# Patient Record
Sex: FEMALE | Race: WHITE | Employment: FULL TIME | URBAN - METROPOLITAN AREA
[De-identification: names, ages, dates, MRNs, and addresses within clinical notes are randomized per-mention and may not be internally consistent; named-entity substitution may affect disease eponyms.]

---

## 2019-06-06 ENCOUNTER — HOSPITAL ENCOUNTER (EMERGENCY)
Age: 39
Discharge: HOME | End: 2019-06-06
Attending: EMERGENCY MEDICINE
Payer: COMMERCIAL

## 2019-06-06 VITALS
DIASTOLIC BLOOD PRESSURE: 80 MMHG | RESPIRATION RATE: 16 BRPM | HEART RATE: 96 BPM | SYSTOLIC BLOOD PRESSURE: 125 MMHG | WEIGHT: 185 LBS | HEIGHT: 65 IN | BODY MASS INDEX: 30.82 KG/M2 | OXYGEN SATURATION: 96 % | TEMPERATURE: 98.8 F

## 2019-06-06 DIAGNOSIS — R11.0 NAUSEA: Primary | ICD-10-CM

## 2019-06-06 DIAGNOSIS — H81.10 BENIGN PAROXYSMAL POSITIONAL VERTIGO, UNSPECIFIED LATERALITY: ICD-10-CM

## 2019-06-06 LAB
ALBUMIN SERPL-MCNC: 4.2 GM/DL (ref 3.4–5)
ALP BLD-CCNC: 83 IU/L (ref 40–129)
ALT SERPL-CCNC: 13 U/L (ref 10–40)
ANION GAP SERPL CALCULATED.3IONS-SCNC: 13 MMOL/L (ref 4–16)
AST SERPL-CCNC: 11 IU/L (ref 15–37)
BACTERIA: NEGATIVE /HPF
BASOPHILS ABSOLUTE: 0.1 K/CU MM
BASOPHILS RELATIVE PERCENT: 0.6 % (ref 0–1)
BILIRUB SERPL-MCNC: 0.1 MG/DL (ref 0–1)
BILIRUBIN URINE: NEGATIVE MG/DL
BLOOD, URINE: NEGATIVE
BUN BLDV-MCNC: 12 MG/DL (ref 6–23)
CALCIUM SERPL-MCNC: 9.2 MG/DL (ref 8.3–10.6)
CHLORIDE BLD-SCNC: 102 MMOL/L (ref 99–110)
CLARITY: CLEAR
CO2: 24 MMOL/L (ref 21–32)
COLOR: YELLOW
CREAT SERPL-MCNC: 0.6 MG/DL (ref 0.6–1.1)
DIFFERENTIAL TYPE: ABNORMAL
EOSINOPHILS ABSOLUTE: 0.2 K/CU MM
EOSINOPHILS RELATIVE PERCENT: 1.6 % (ref 0–3)
GFR AFRICAN AMERICAN: >60 ML/MIN/1.73M2
GFR NON-AFRICAN AMERICAN: >60 ML/MIN/1.73M2
GLUCOSE BLD-MCNC: 95 MG/DL (ref 70–99)
GLUCOSE, URINE: NEGATIVE MG/DL
HCG QUALITATIVE: NEGATIVE
HCT VFR BLD CALC: 39 % (ref 37–47)
HEMOGLOBIN: 11.4 GM/DL (ref 12.5–16)
IMMATURE NEUTROPHIL %: 0.3 % (ref 0–0.43)
KETONES, URINE: NEGATIVE MG/DL
LEUKOCYTE ESTERASE, URINE: NEGATIVE
LYMPHOCYTES ABSOLUTE: 2.8 K/CU MM
LYMPHOCYTES RELATIVE PERCENT: 28.1 % (ref 24–44)
MCH RBC QN AUTO: 22.4 PG (ref 27–31)
MCHC RBC AUTO-ENTMCNC: 29.2 % (ref 32–36)
MCV RBC AUTO: 76.5 FL (ref 78–100)
MONOCYTES ABSOLUTE: 0.6 K/CU MM
MONOCYTES RELATIVE PERCENT: 6.2 % (ref 0–4)
NITRITE URINE, QUANTITATIVE: NEGATIVE
NUCLEATED RBC %: 0 %
PDW BLD-RTO: 17.1 % (ref 11.7–14.9)
PH, URINE: 6 (ref 5–8)
PLATELET # BLD: 268 K/CU MM (ref 140–440)
PMV BLD AUTO: 10.6 FL (ref 7.5–11.1)
POTASSIUM SERPL-SCNC: 4 MMOL/L (ref 3.5–5.1)
PROTEIN UA: NEGATIVE MG/DL
RBC # BLD: 5.1 M/CU MM (ref 4.2–5.4)
RBC URINE: <1 /HPF (ref 0–6)
SEGMENTED NEUTROPHILS ABSOLUTE COUNT: 6.2 K/CU MM
SEGMENTED NEUTROPHILS RELATIVE PERCENT: 63.2 % (ref 36–66)
SODIUM BLD-SCNC: 139 MMOL/L (ref 135–145)
SPECIFIC GRAVITY UA: 1.01 (ref 1–1.03)
SQUAMOUS EPITHELIAL: 12 /HPF
TOTAL IMMATURE NEUTOROPHIL: 0.03 K/CU MM
TOTAL NUCLEATED RBC: 0 K/CU MM
TOTAL PROTEIN: 7 GM/DL (ref 6.4–8.2)
TRICHOMONAS: NORMAL /HPF
UROBILINOGEN, URINE: NORMAL MG/DL (ref 0.2–1)
WBC # BLD: 9.8 K/CU MM (ref 4–10.5)
WBC UA: 1 /HPF (ref 0–5)

## 2019-06-06 PROCEDURE — 6370000000 HC RX 637 (ALT 250 FOR IP): Performed by: EMERGENCY MEDICINE

## 2019-06-06 PROCEDURE — 80053 COMPREHEN METABOLIC PANEL: CPT

## 2019-06-06 PROCEDURE — 96374 THER/PROPH/DIAG INJ IV PUSH: CPT

## 2019-06-06 PROCEDURE — 81001 URINALYSIS AUTO W/SCOPE: CPT

## 2019-06-06 PROCEDURE — 85025 COMPLETE CBC W/AUTO DIFF WBC: CPT

## 2019-06-06 PROCEDURE — 2580000003 HC RX 258: Performed by: EMERGENCY MEDICINE

## 2019-06-06 PROCEDURE — 84703 CHORIONIC GONADOTROPIN ASSAY: CPT

## 2019-06-06 PROCEDURE — 36415 COLL VENOUS BLD VENIPUNCTURE: CPT

## 2019-06-06 PROCEDURE — 96361 HYDRATE IV INFUSION ADD-ON: CPT

## 2019-06-06 PROCEDURE — 99284 EMERGENCY DEPT VISIT MOD MDM: CPT

## 2019-06-06 PROCEDURE — 6360000002 HC RX W HCPCS: Performed by: EMERGENCY MEDICINE

## 2019-06-06 PROCEDURE — 93005 ELECTROCARDIOGRAM TRACING: CPT | Performed by: EMERGENCY MEDICINE

## 2019-06-06 RX ORDER — MECLIZINE HYDROCHLORIDE 25 MG/1
25 TABLET ORAL 3 TIMES DAILY PRN
Qty: 30 TABLET | Refills: 0 | Status: SHIPPED | OUTPATIENT
Start: 2019-06-06 | End: 2019-06-16

## 2019-06-06 RX ORDER — MECLIZINE HYDROCHLORIDE 25 MG/1
25 TABLET ORAL ONCE
Status: COMPLETED | OUTPATIENT
Start: 2019-06-06 | End: 2019-06-06

## 2019-06-06 RX ORDER — ONDANSETRON 2 MG/ML
4 INJECTION INTRAMUSCULAR; INTRAVENOUS EVERY 6 HOURS PRN
Status: DISCONTINUED | OUTPATIENT
Start: 2019-06-06 | End: 2019-06-07 | Stop reason: HOSPADM

## 2019-06-06 RX ORDER — 0.9 % SODIUM CHLORIDE 0.9 %
1000 INTRAVENOUS SOLUTION INTRAVENOUS ONCE
Status: COMPLETED | OUTPATIENT
Start: 2019-06-06 | End: 2019-06-06

## 2019-06-06 RX ORDER — ONDANSETRON 4 MG/1
4 TABLET, FILM COATED ORAL DAILY PRN
Qty: 30 TABLET | Refills: 0 | Status: SHIPPED | OUTPATIENT
Start: 2019-06-06

## 2019-06-06 RX ADMIN — MECLIZINE HYDROCHLORIDE 25 MG: 25 TABLET ORAL at 20:17

## 2019-06-06 RX ADMIN — SODIUM CHLORIDE 1000 ML: 9 INJECTION, SOLUTION INTRAVENOUS at 20:18

## 2019-06-06 RX ADMIN — ONDANSETRON 4 MG: 2 INJECTION INTRAMUSCULAR; INTRAVENOUS at 20:17

## 2019-06-06 NOTE — ED PROVIDER NOTES
Triage Chief Complaint:   Dizziness (onset today ); Nausea (onset 1 week no vomiting); and Shaking    [place]:  Trip Lares is a 43 y.o. female that presents with nausea and dizziness. Patient was in baseline state of health until 1 week ago when the above started. Patient has been nauseous with eating for the past week. No vomiting. No pain. No change in diet. No new meds. Not taking anything for this. Some dizziness today with moving \"head too fast\". Dizziness is described as spinning sensation. No numbness/weakness. No vision changes. Patient does have history of chronic ear infections and she is wondering if that is causing her problems. Patient with constant \"ear problems\" and nothing out of the ordinary at this time. No head trauma. No fevers. ROS:  General:  No fevers, no chills, no weakness  Eyes:  No recent vison changes, no discharge  ENT:  No sore throat, no nasal congestion, no hearing changes  Cardiovascular:  No chest pain, no palpitations  Respiratory:  No shortness of breath, no cough, no wheezing  Gastrointestinal:  No pain, + nausea, no vomiting, no diarrhea  Musculoskeletal:  No muscle pain, no joint pain  Skin:  No rash, no pruritis, no easy bruising  Neurologic:  No speech problems, no headache, no extremity numbness, no extremity tingling, no extremity weakness, + dizziness  Psychiatric:  No anxiety  Genitourinary:  No dysuria, no hematuria  Endocrine:  No unexpected weight gain, no unexpected weight loss  Extremities:  no edema, no pain    History reviewed. No pertinent past medical history. Past Surgical History:   Procedure Laterality Date     SECTION      TUBAL LIGATION       History reviewed. No pertinent family history.   Social History     Socioeconomic History    Marital status:      Spouse name: Not on file    Number of children: Not on file    Years of education: Not on file    Highest education level: Not on file   Occupational History    Not on file   Social Needs    Financial resource strain: Not on file    Food insecurity:     Worry: Not on file     Inability: Not on file    Transportation needs:     Medical: Not on file     Non-medical: Not on file   Tobacco Use    Smoking status: Current Every Day Smoker     Packs/day: 1.00     Types: Cigarettes    Smokeless tobacco: Never Used   Substance and Sexual Activity    Alcohol use: No    Drug use: No    Sexual activity: Not on file   Lifestyle    Physical activity:     Days per week: Not on file     Minutes per session: Not on file    Stress: Not on file   Relationships    Social connections:     Talks on phone: Not on file     Gets together: Not on file     Attends [employer] service: Not on file     Active member of club or organization: Not on file     Attends meetings of clubs or organizations: Not on file     Relationship status: Not on file    Intimate partner violence:     Fear of current or ex partner: Not on file     Emotionally abused: Not on file     Physically abused: Not on file     Forced sexual activity: Not on file   Other Topics Concern    Not on file   Social History Narrative    Not on file     Current Facility-Administered Medications   Medication Dose Route Frequency Provider Last Rate Last Dose    ondansetron (ZOFRAN) injection 4 mg  4 mg Intravenous Q6H PRN Naty Alcazar MD   4 mg at 06/06/19 2017     Current Outpatient Medications   Medication Sig Dispense Refill    ondansetron (ZOFRAN) 4 MG tablet Take 1 tablet by mouth daily as needed for Nausea or Vomiting 30 tablet 0    meclizine (ANTIVERT) 25 MG tablet Take 1 tablet by mouth 3 times daily as needed for Dizziness or Nausea 30 tablet 0     Allergies   Allergen Reactions    Penicillins Anaphylaxis       Nursing Notes Reviewed    Physical Exam:  ED Triage Vitals [06/06/19 1727]   Enc Vitals Group      /80      Pulse 96      Resp 16      Temp 98.8 °F (37.1 °C)      Temp Source Oral      SpO2 96 % Weight 185 lb (83.9 kg)      Height 5' 5\" (1.651 m)      Head Circumference       Peak Flow       Pain Score       Pain Loc       Pain Edu? Excl. in [address]? My pulse ox interpretation is - normal    General appearance:  No acute distress. Sitting comfortable in bed. Appears very well. Pleasant. Skin:  Warm. Dry. No diaphoresis. Eye:  Extraocular movements intact. Pupils equal round and reactive to light. Ears, nose, mouth and throat:  Oral mucosa moist   Neck:  Trachea midline. No meningismus. Extremity:  No swelling. Normal ROM     Heart:  Regular rate and rhythm, normal S1 & S2, no extra heart sounds. Perfusion:  Intact   Respiratory:  Lungs clear to auscultation bilaterally. Respirations nonlabored. Speaking clearly in full sentences. No respiratory distress. Abdominal:  Normal bowel sounds. Soft. Nontender. Non distended. Completely nontender. Negative Herrera's. Back:  No CVA tenderness to palpation     Neurological:  Alert and oriented times 3. No focal neuro deficits.   Sensation intact to light touch to distal upper/lower extremities; 5/5 and symmetric shrug, , HF, KF/KE, and dorsi/plantar flexion; symmetric brow raise and nasolabial folds, tongue midline; FTN and NELLY intact; 2+ and symmetric reflexes to bilateral upper/lower extremities; ambulates with steady gait; no dysarthria or aphasia          Psychiatric:  Appropriate    I have reviewed and interpreted all of the currently available lab results from this visit (if applicable):  Results for orders placed or performed during the hospital encounter of 06/06/19   CBC Auto Differential   Result Value Ref Range    WBC 9.8 4.0 - 10.5 K/CU MM    RBC 5.10 4.2 - 5.4 M/CU MM    Hemoglobin 11.4 (L) 12.5 - 16.0 GM/DL    Hematocrit 39.0 37 - 47 %    MCV 76.5 (L) 78 - 100 FL    MCH 22.4 (L) 27 - 31 PG    MCHC 29.2 (L) 32.0 - 36.0 %    RDW 17.1 (H) 11.7 - 14.9 %    Platelets 217 493 - 551 K/CU MM    MPV 10.6 7.5 - 11.1 FL Differential Type AUTOMATED DIFFERENTIAL     Segs Relative 63.2 36 - 66 %    Lymphocytes % 28.1 24 - 44 %    Monocytes % 6.2 (H) 0 - 4 %    Eosinophils % 1.6 0 - 3 %    Basophils % 0.6 0 - 1 %    Segs Absolute 6.2 K/CU MM    Lymphocytes # 2.8 K/CU MM    Monocytes # 0.6 K/CU MM    Eosinophils # 0.2 K/CU MM    Basophils # 0.1 K/CU MM    Nucleated RBC % 0.0 %    Total Nucleated RBC 0.0 K/CU MM    Total Immature Neutrophil 0.03 K/CU MM    Immature Neutrophil % 0.3 0 - 0.43 %   Comprehensive Metabolic Panel   Result Value Ref Range    Sodium 139 135 - 145 MMOL/L    Potassium 4.0 3.5 - 5.1 MMOL/L    Chloride 102 99 - 110 mMol/L    CO2 24 21 - 32 MMOL/L    BUN 12 6 - 23 MG/DL    CREATININE 0.6 0.6 - 1.1 MG/DL    Glucose 95 70 - 99 MG/DL    Calcium 9.2 8.3 - 10.6 MG/DL    Alb 4.2 3.4 - 5.0 GM/DL    Total Protein 7.0 6.4 - 8.2 GM/DL    Total Bilirubin 0.1 0.0 - 1.0 MG/DL    ALT 13 10 - 40 U/L    AST 11 (L) 15 - 37 IU/L    Alkaline Phosphatase 83 40 - 129 IU/L    GFR Non-African American >60 >60 mL/min/1.73m2    GFR African American >60 >60 mL/min/1.73m2    Anion Gap 13 4 - 16   Urinalysis   Result Value Ref Range    Color, UA YELLOW YELLOW    Clarity, UA CLEAR CLEAR    Glucose, Urine NEGATIVE NEGATIVE MG/DL    Bilirubin Urine NEGATIVE NEGATIVE MG/DL    Ketones, Urine NEGATIVE NEGATIVE MG/DL    Specific Gravity, UA 1.009 1.001 - 1.035    Blood, Urine NEGATIVE NEGATIVE    pH, Urine 6.0 5.0 - 8.0    Protein, UA NEGATIVE NEGATIVE MG/DL    Urobilinogen, Urine NORMAL 0.2 - 1.0 MG/DL    Nitrite Urine, Quantitative NEGATIVE NEGATIVE    Leukocyte Esterase, Urine NEGATIVE NEGATIVE    RBC, UA <1 0 - 6 /HPF    WBC, UA 1 0 - 5 /HPF    Bacteria, UA NEGATIVE NEGATIVE /HPF    Squam Epithel, UA 12 /HPF    Trichomonas, UA NONE SEEN NONE SEEN /HPF   HCG Serum, Qualitative   Result Value Ref Range    hCG Qual NEGATIVE    EKG 12 Lead   Result Value Ref Range    Ventricular Rate 71 BPM    Atrial Rate 71 BPM    P-R Interval 134 ms    QRS Duration 96 ms    Q-T Interval 388 ms    QTc Calculation (Bazett) 421 ms    P Axis 25 degrees    R Axis 33 degrees    T Axis 48 degrees    Diagnosis       Normal sinus rhythm with sinus arrhythmia  Normal ECG  No previous ECGs available        Radiographs (if obtained):  [] The following radiograph was interpreted by myself in the absence of a radiologist:   [] Radiologist's Report Reviewed:  No orders to display         EKG (if obtained): (All EKG's are interpreted by myself in the absence of a cardiologist)  12 lead EKG per my interpretation:  Normal Sinus Rhythm at 71 with sinus arrhythmia  Axis is   Normal  QTc is  within an acceptable range  There is no specific T wave changes appreciated. There is no specific ST wave changes appreciated. No STEMI    Prior EKG to compare with was not available. Chart review shows recent radiographs:  No results found. MDM:  Pt presents as above. Emergent conditions considered. Presentation prompted initial labs, EKG. IV was established and IV fluid bolus, meclizine and IV Zofran were given. CBC and CMP without clinically significant derangement. HCG negative. Urinalysis negative for urinary tract infection. EKG with a normal sinus rhythm as above. Patient with nausea only and likely a peripheral vertiginous process. No red flag features probably more emergent workup. Patient reports feeling much improved on reassessment and is willing to go home. Patient counseled the need for close primary care follow-up. Sinemet treatment with Zofran and meclizine for home. Questions sought and answered with the patient. They voice understanding and agree with plan. Instructed to return for any worsening or worrisome concerns. Clinical Impression:  1. Nausea    2. Benign paroxysmal positional vertigo, unspecified laterality      Disposition referral (if applicable):   [facility]  [phone]  Schedule an appointment as soon as possible for a visit   [address] (SEE BELOW)    [facility] Emergency Department  [address] [id number]  [phone]  Today  If symptoms worsen    Disposition medications (if applicable):  New Prescriptions    MECLIZINE (ANTIVERT) 25 MG TABLET    Take 1 tablet by mouth 3 times daily as needed for Dizziness or Nausea    ONDANSETRON (ZOFRAN) 4 MG TABLET    Take 1 tablet by mouth daily as needed for Nausea or Vomiting       Comment: Please note this report has been produced using speech recognition software and may contain errors related to that system including errors in grammar, punctuation, and spelling, as well as words and phrases that may be inappropriate. If there are any questions or concerns please feel free to contact the dictating provider for clarification.        Bryan Curtis MD  06/06/19 0280

## 2019-06-07 LAB
EKG ATRIAL RATE: 71 BPM
EKG DIAGNOSIS: NORMAL
EKG P AXIS: 25 DEGREES
EKG P-R INTERVAL: 134 MS
EKG Q-T INTERVAL: 388 MS
EKG QRS DURATION: 96 MS
EKG QTC CALCULATION (BAZETT): 421 MS
EKG R AXIS: 33 DEGREES
EKG T AXIS: 48 DEGREES
EKG VENTRICULAR RATE: 71 BPM

## 2019-06-07 PROCEDURE — 93010 ELECTROCARDIOGRAM REPORT: CPT | Performed by: INTERNAL MEDICINE

## 2019-06-07 NOTE — PROGRESS NOTES
Medication History  [facility]    Patient Name: Mak Crews 1980     Medication history has been completed by: Marlene Sen CPhT    Source(s) of information: Patient and Insurance claims    Primary Care Physician: No primary care provider on file. Pharmacy: Unknown    Allergies as of 06/06/2019 - Review Complete 06/06/2019   Allergen Reaction Noted    Penicillins Anaphylaxis 08/03/2017        Prior to Admission medications    Medication Sig Start Date End Date Taking?  Authorizing Provider                     Medications removed from list (include reason, ex. noncompliance, medication cost, therapy complete etc.):   Montelukast no longer taking  Naproxen no longer taking    Other Comments:  Reviewed and updated med list per patient and verified with claims    To my knowledge the above medication history is accurate as of 6/6/2019 7:58 PM.   Marlene Sen CPhT   6/6/2019 7:58 PM

## 2022-05-16 ENCOUNTER — APPOINTMENT (OUTPATIENT)
Dept: GENERAL RADIOLOGY | Age: 42
End: 2022-05-16
Payer: COMMERCIAL

## 2022-05-16 ENCOUNTER — HOSPITAL ENCOUNTER (EMERGENCY)
Age: 42
Discharge: HOME | End: 2022-05-16
Attending: EMERGENCY MEDICINE
Payer: COMMERCIAL

## 2022-05-16 VITALS
SYSTOLIC BLOOD PRESSURE: 142 MMHG | DIASTOLIC BLOOD PRESSURE: 80 MMHG | RESPIRATION RATE: 16 BRPM | HEIGHT: 65 IN | OXYGEN SATURATION: 98 % | BODY MASS INDEX: 31.65 KG/M2 | WEIGHT: 190 LBS | TEMPERATURE: 97.3 F | HEART RATE: 83 BPM

## 2022-05-16 DIAGNOSIS — M25.462 KNEE EFFUSION, LEFT: ICD-10-CM

## 2022-05-16 DIAGNOSIS — S83.8X2A SPRAIN OF OTHER LIGAMENT OF LEFT KNEE, INITIAL ENCOUNTER: Primary | ICD-10-CM

## 2022-05-16 PROCEDURE — 99283 EMERGENCY DEPT VISIT LOW MDM: CPT

## 2022-05-16 PROCEDURE — 73562 X-RAY EXAM OF KNEE 3: CPT

## 2022-05-16 RX ORDER — NAPROXEN 375 MG/1
375 TABLET ORAL 2 TIMES DAILY PRN
Qty: 60 TABLET | Refills: 0 | Status: SHIPPED | OUTPATIENT
Start: 2022-05-16

## 2022-05-16 RX ORDER — METHOCARBAMOL 500 MG/1
500 TABLET, FILM COATED ORAL 3 TIMES DAILY PRN
Qty: 30 TABLET | Refills: 0 | Status: SHIPPED | OUTPATIENT
Start: 2022-05-16 | End: 2022-05-26

## 2022-05-16 ASSESSMENT — PAIN DESCRIPTION - DESCRIPTORS: DESCRIPTORS: ACHING

## 2022-05-16 ASSESSMENT — PAIN SCALES - GENERAL: PAINLEVEL_OUTOF10: 8

## 2022-05-16 ASSESSMENT — PAIN DESCRIPTION - ORIENTATION: ORIENTATION: LEFT

## 2022-05-16 ASSESSMENT — PAIN - FUNCTIONAL ASSESSMENT: PAIN_FUNCTIONAL_ASSESSMENT: 0-10

## 2022-05-16 ASSESSMENT — PAIN DESCRIPTION - LOCATION: LOCATION: KNEE

## 2022-05-16 NOTE — ED NOTES
The patient presents to the er today with complaints of left knee pain. The patient denies any injury to the left knee, and reports that is just started hurting last night. Call light is within reach, family is at the bedside. The patient was able to walk to room 3.                         Teagan Roger RN  05/16/22 3833

## 2022-05-16 NOTE — ED PROVIDER NOTES
Emergency Department Encounter  [address]    Patient: Sherri Portillo  MRN: [id number]  : 1980  Date of Evaluation: 2022  ED Provider: Chacorta Hall MD    Chief Complaint       Chief Complaint   Patient presents with    Knee Pain     reports of left knee pain that started last night denies any njury      Tristen Stout is a 42 y.o. female who presents to the emergency department for evaluation of atraumatic left knee pain. Patient reports being in usual state of health until yesterday. Does report that she frequently will get knee discomfort she says but she is usually able to\" pop\" her knee and have resolution of the pain in an hour or 2. Yesterday after attending a family birthday party patient says that she had left knee pain but was unable to get relief from it. Says it has been bothering her all night long. Denies numbness tingling coldness distal to the point of injury. Pain is worse with ambulation or bending her knee. States that she has not been able to get any relief. Has not tried taking medications for her symptoms as of yet. ROS:     At least 10 systems reviewed and otherwise acutely negative except as in the [address]     Past History     Past Medical History:   Diagnosis Date    Kidney stone      Past Surgical History:   Procedure Laterality Date     SECTION      TUBAL LIGATION       Social History     Socioeconomic History    Marital status:      Spouse name: None    Number of children: None    Years of education: None    Highest education level: None   Occupational History    None   Tobacco Use    Smoking status: Current Every Day Smoker     Packs/day: 1.00     Types: Cigarettes    Smokeless tobacco: Never Used   Substance and Sexual Activity    Alcohol use: No     Comment: rare    Drug use: No    Sexual activity: Yes     Partners: Male   Other Topics Concern    None   Social History Narrative    None     Social Determinants of Health     Financial Resource Strain:     Difficulty of Paying Living Expenses: Not on file   Food Insecurity:     Worried About Running Out of Food in the Last Year: Not on file    Cleo of Food in the Last Year: Not on file   Transportation Needs:     Lack of Transportation (Medical): Not on file    Lack of Transportation (Non-Medical): Not on file   Physical Activity:     Days of Exercise per Week: Not on file    Minutes of Exercise per Session: Not on file   Stress:     Feeling of Stress : Not on file   Social Connections:     Frequency of Communication with Friends and Family: Not on file    Frequency of Social Gatherings with Friends and Family: Not on file    Attends Christianity Services: Not on file    Active Member of [address] [place] [address] [employer] or Organizations: Not on file    Attends Club or Organization Meetings: Not on file    Marital Status: Not on file   Intimate Partner Violence:     Fear of Current or Ex-Partner: Not on file    Emotionally Abused: Not on file    Physically Abused: Not on file    Sexually Abused: Not on file   Housing Stability:     Unable to Pay for Housing in the Last Year: Not on file    Number of [place] in the Last Year: Not on file    Unstable Housing in the Last Year: Not on file       Medications/Allergies     Previous Medications    ONDANSETRON (ZOFRAN) 4 MG TABLET    Take 1 tablet by mouth daily as needed for Nausea or Vomiting     Allergies   Allergen Reactions    Penicillins Anaphylaxis        Physical Exam       ED Triage Vitals [05/16/22 0801]   BP Temp Temp Source Pulse Resp SpO2 Height Weight   (!) 142/80 97.3 °F (36.3 °C) Skin 83 16 98 % 5' 5\" (1.651 m) 190 lb (86.2 kg)     GENERAL APPEARANCE: Awake and alert. Cooperative. No acute distress. HEAD: Normocephalic. Atraumatic. EYES: Sclera anicteric. Pupils equal round reactive to light extraocular movements are intact  ENT: Tolerates saliva. No trismus. Moist mucous membranes  NECK: Supple. Trachea midline. No meningismus  CARDIO: RRR. Radial pulse 2+. No murmurs rubs or gallops appreciated  LUNGS: Respirations unlabored. CTAB. No accessory muscle usage noted. No wheezes rales rhonchi or stridor. ABDOMEN: Soft. Non-distended. Non-tender. No tenderness in right upper quadrant or right lower quadrant to deep palpation  EXTREMITIES: No acute deformities. No unilateral leg swelling or tenderness behind either one of calves particular examination of patient's left lower leg no tenderness when palpating throughout the left hip proximal distal femur. No tenderness over the fibular head or tibial plateau. Negative anterior and posterior drawer sign. No joint laxity during varus and valgus stressing. Palpable DP and PT pulses intact sensation light touch capillary refills less than 3 seconds no tenderness behind the patient's left calf. No tense when palpating over the first or fifth metatarsal or throughout the left ankle. SKIN: Warm and dry. No erythema edema or rashes appreciated  NEUROLOGICAL:  Cranial nerves II through XII grossly intact. No gross facial drooping. Moves all 4 extremities spontaneously. PSYCHIATRIC: Normal mood. Alert and oriented x3. No reported active suicidality or homicidality. Diagnostics   Labs:  No results found for this visit on 05/16/22. Radiographs:  XR KNEE LEFT (3 VIEWS)    Result Date: 5/16/2022  EXAMINATION: THREE XRAY VIEWS OF THE LEFT KNEE 5/16/2022 8:17 am COMPARISON: None. HISTORY: ORDERING SYSTEM PROVIDED HISTORY: pain no trauma TECHNOLOGIST PROVIDED HISTORY: Reason for exam:->pain no trauma FINDINGS: There is no acute fracture or dislocation. Moderate joint effusion is noted. Well-defined sclerotic lesion is present within the proximal medial tibial metaphysis favoring a bone island. The joint spaces are maintained. 1. Moderate joint effusion.        Procedures/EKG:       ED Course and MDM   In brief, Kassi Mcintyre is a 42 y.o. female who presented to the emergency department for evaluation of atraumatic left knee pain. Based on patient's history physical would be concerned about possible osteoarthritis no evidence of trauma noted examination she is not complaining of any paresthesias distal to the point of injury no evidence of neurovascular disruption or trauma at this time. No tenderness by the patient's left calf to suggest DVT given that this is happened multiple times in the past low clinical suspicion for vascular type injury. Did review patient's imaging studies. Does show moderate joint effusion noted on x-ray. Discussed concern for possible meniscal injury or ligamentous type injury. Recommend use of crutches and knee immobilizer and close follow-up with orthopedics for further evaluation. Recommend close follow-up with primary care physician or referral physician next 24 to 48 hours. Return to the emergency department for increasing pain, numbness tingling coldness distal to the point of injury, any other concerning symptoms. She did express a verbal understanding of these instructions        ED Medication Orders (From admission, onward)    None          Final Impression      1. Sprain of other ligament of left knee, initial encounter    2.  Knee effusion, left      DISPOSITION           (Please note that portions of this note may have been completed with a voice recognition program. Efforts were made to edit the dictations but occasionally words are mis-transcribed.)    Ibrahima Mehta MD  [id number] MD  05/16/22 [id number]

## 2022-05-16 NOTE — LETTER
Jameel Sifuentes was seen and treated in our emergency department on 5/16/2022. She may return to work on 05/18/2022. If you have any questions or concerns, please don't hesitate to call.       Yarelis Beasley MD

## 2022-05-16 NOTE — ED NOTES
Discharge instructions and prescriptions were reviewed and the patient will follow up with Dr. Coco Sawyer. The patient voiced understanding of these instructions.        Anshu Viera RN  05/16/22 [id number]

## 2022-05-16 NOTE — ED NOTES
A knee immobilizer was placed on the left leg. The patient was fitted with crutches and she was able to demonstrate proper use.       Arturo Wheeler RN  05/16/22 [id number]

## 2022-05-19 ENCOUNTER — OFFICE VISIT (OUTPATIENT)
Dept: ORTHOPEDIC SURGERY | Age: 42
End: 2022-05-19
Payer: COMMERCIAL

## 2022-05-19 VITALS
WEIGHT: 190 LBS | BODY MASS INDEX: 31.65 KG/M2 | SYSTOLIC BLOOD PRESSURE: 148 MMHG | HEIGHT: 65 IN | OXYGEN SATURATION: 98 % | HEART RATE: 67 BPM | DIASTOLIC BLOOD PRESSURE: 98 MMHG | RESPIRATION RATE: 15 BRPM

## 2022-05-19 DIAGNOSIS — S83.92XA SPRAIN OF LEFT KNEE, UNSPECIFIED LIGAMENT, INITIAL ENCOUNTER: ICD-10-CM

## 2022-05-19 DIAGNOSIS — M25.462 EFFUSION, LEFT KNEE: Primary | ICD-10-CM

## 2022-05-19 PROCEDURE — 99203 OFFICE O/P NEW LOW 30 MIN: CPT | Performed by: ORTHOPAEDIC SURGERY

## 2022-05-19 NOTE — PROGRESS NOTES
Patient presents to the office today for ED FU of the left knee pain. Patient states pain started on 5/15/22 and has progressively gotten worse. Pain is globally in the left knee. She denies any injury to the left knee. She has been wearing her brace which does make the pain worse. Pt states this has happened to her several years ago also. Pt is not able to bear full weight on the left knee.

## 2022-05-19 NOTE — PROGRESS NOTES
2022   Chief Complaint   Patient presents with    Knee Pain     left knee pain         History of Present Illness:                             Colby Cm is a 42 y.o. female who presents today for evaluation of her left knee. She was seen in the emergency room because of severe pain and swelling that happened spontaneously. She has had issues in the past where she has to pop her knee and move it in a certain way to get it to release. She has had significant progression of her swelling over the last week or so. She has tried bracing and anti-inflammatory medications with no relief of her symptoms. Patient presents to the office today for ED FU of the left knee pain. Patient states pain started on 5/15/22 and has progressively gotten worse. Pain is globally in the left knee. She denies any injury to the left knee. She has been wearing her brace which does make the pain worse. Pt states this has happened to her several years ago also. Pt is not able to bear full weight on the left knee. Medical History  Patient's medications, allergies, past medical, surgical, social and family histories were reviewed and updated as appropriate. Past Medical History:   Diagnosis Date    Kidney stone      Past Surgical History:   Procedure Laterality Date     SECTION      TUBAL LIGATION       No family history on file.   Social History     Socioeconomic History    Marital status:      Spouse name: None    Number of children: None    Years of education: None    Highest education level: None   Occupational History    None   Tobacco Use    Smoking status: Current Every Day Smoker     Packs/day: 1.00     Types: Cigarettes    Smokeless tobacco: Never Used   Substance and Sexual Activity    Alcohol use: No     Comment: rare    Drug use: No    Sexual activity: Yes     Partners: Male   Other Topics Concern    None   Social History Narrative    None     Social Determinants of Health     Financial Resource Strain:     Difficulty of Paying Living Expenses: Not on file   Food Insecurity:     Worried About Running Out of Food in the Last Year: Not on file    Cleo of Food in the Last Year: Not on file   Transportation Needs:     Lack of Transportation (Medical): Not on file    Lack of Transportation (Non-Medical): Not on file   Physical Activity:     Days of Exercise per Week: Not on file    Minutes of Exercise per Session: Not on file   Stress:     Feeling of Stress : Not on file   Social Connections:     Frequency of Communication with Friends and Family: Not on file    Frequency of Social Gatherings with Friends and Family: Not on file    Attends Latter day Services: Not on file    Active Member of [address] [place] [address] [employer] or Organizations: Not on file    Attends Club or Organization Meetings: Not on file    Marital Status: Not on file   Intimate Partner Violence:     Fear of Current or Ex-Partner: Not on file    Emotionally Abused: Not on file    Physically Abused: Not on file    Sexually Abused: Not on file   Housing Stability:     Unable to Pay for Housing in the Last Year: Not on file    Number of [place] in the Last Year: Not on file    Unstable Housing in the Last Year: Not on file     Current Outpatient Medications   Medication Sig Dispense Refill    naproxen (NAPROSYN) 375 MG tablet Take 1 tablet by mouth 2 times daily as needed for Pain 60 tablet 0    methocarbamol (ROBAXIN) 500 MG tablet Take 1 tablet by mouth 3 times daily as needed (muscle pain) 30 tablet 0    ondansetron (ZOFRAN) 4 MG tablet Take 1 tablet by mouth daily as needed for Nausea or Vomiting (Patient not taking: Reported on 5/16/2022) 30 tablet 0     No current facility-administered medications for this visit. Allergies   Allergen Reactions    Penicillins Anaphylaxis         Review of Systems   Constitutional: Negative for chills and fever. HENT: Negative for congestion and sneezing.     Eyes: Negative for pain and redness. Respiratory: Negative for chest tightness, shortness of breath and wheezing. Cardiovascular: Negative for chest pain and palpitations. Gastrointestinal: Negative for vomiting. Musculoskeletal: Positive for arthralgias. Skin: Negative for color change and rash. Neurological: Negative for weakness and numbness. Psychiatric/Behavioral: Negative for agitation. The patient is not nervous/anxious. Examination:  General Exam:  Vitals: BP (!) 148/98   Pulse 67   Resp 15   Ht 5' 5\" (1.651 m)   Wt 190 lb (86.2 kg)   SpO2 98%   BMI 31.62 kg/m²    Physical Exam  Vitals and nursing note reviewed. Constitutional:       Appearance: Normal appearance. HENT:      Head: Normocephalic and atraumatic. Eyes:      Conjunctiva/sclera: Conjunctivae normal.      Pupils: Pupils are equal, round, and reactive to light. Pulmonary:      Effort: Pulmonary effort is normal.   Musculoskeletal:      Cervical back: Normal range of motion. Right hip: Normal.      Left hip: No deformity, tenderness, bony tenderness or crepitus. Normal range of motion. Normal strength. Right knee: No swelling, deformity, effusion, erythema, ecchymosis or bony tenderness. Normal range of motion. No medial joint line or lateral joint line tenderness. No LCL laxity or MCL laxity. Normal alignment and normal patellar mobility. Comments: Left Lower Extremity:    There is moderate diffuse tenderness at the knee joint and severe tenderness maximally along the the medial joint line. There is a moderate effusion present at the knee. Range of motion is from full extension to 130 degrees of flexion and limited at terminal flexion due to pain. There is a positive medial Hina's test with pain and mild clicking.   There is a negative anterior drawer and Lachman test.  Negative posterior drawer test.  No laxity during valgus stress testing at full extension and 30 degrees of flexion. No laxity with varus stress test.  Skin is intact with no lacerations. No erythema or rash. Strength at the knee is 5 out of 5 with flexion and extension however testing is limited due to pain. Sensation to light touch is intact throughout. Pulses intact     Skin:     General: Skin is warm and dry. Neurological:      Mental Status: She is alert and oriented to person, place, and time. Psychiatric:         Mood and Affect: Mood normal.         Behavior: Behavior normal.            Diagnostic testing:  X-ray images were reviewed by myself and discussed with the patient:  FINDINGS:   There is no acute fracture or dislocation.  Moderate joint effusion is noted. Well-defined sclerotic lesion is present within the proximal medial tibial   metaphysis favoring a bone island.  The joint spaces are maintained.           Impression   1. Moderate joint effusion. Office Procedures:  No orders of the defined types were placed in this encounter. Assessment and Plan  1. left knee sprain    2. Left knee effusion    Based on the history and my physical exam, I have a high index of suspicion for intra-articular pathology such as a tear of the meniscus. I feel it is medically necessary to obtain an MRI to evaluate for tear of the medial meniscus and other intra-articular pathology such injury to the collateral ligaments, cartilage surfaces, or presence of loose body. I have instructed the patient on gentle range of motion exercises for the knee and avoidance of strenuous or painful activities. They will follow-up after the MRI for a review of the results.       Electronically signed by Samra Kapadia MD on 5/19/2022 at 11:51 AM

## 2022-05-19 NOTE — PATIENT INSTRUCTIONS
Continue weight-bearing as tolerated. Continue range of motion exercises as instructed. Ice and elevate as needed. Tylenol or Motrin for pain. Mri of the left knee ordered today  Central scheduling [phone] will be calling you to schedule your MRI. If you do not hear from them with in a week give them a call.    Follow up once MRI is completed

## 2022-05-24 ASSESSMENT — ENCOUNTER SYMPTOMS
WHEEZING: 0
EYE REDNESS: 0
VOMITING: 0
COLOR CHANGE: 0
SHORTNESS OF BREATH: 0
CHEST TIGHTNESS: 0
EYE PAIN: 0

## 2022-05-27 ENCOUNTER — HOSPITAL ENCOUNTER (OUTPATIENT)
Dept: MRI IMAGING | Age: 42
Discharge: HOME | End: 2022-05-27
Payer: COMMERCIAL

## 2022-05-27 DIAGNOSIS — S83.92XA SPRAIN OF LEFT KNEE, UNSPECIFIED LIGAMENT, INITIAL ENCOUNTER: ICD-10-CM

## 2022-05-27 DIAGNOSIS — M25.462 EFFUSION, LEFT KNEE: ICD-10-CM

## 2022-05-27 PROCEDURE — 73721 MRI JNT OF LWR EXTRE W/O DYE: CPT

## 2022-06-02 ENCOUNTER — OFFICE VISIT (OUTPATIENT)
Dept: ORTHOPEDIC SURGERY | Age: 42
End: 2022-06-02
Payer: COMMERCIAL

## 2022-06-02 VITALS
HEIGHT: 65 IN | HEART RATE: 72 BPM | BODY MASS INDEX: 30.49 KG/M2 | OXYGEN SATURATION: 96 % | WEIGHT: 183 LBS | RESPIRATION RATE: 16 BRPM | DIASTOLIC BLOOD PRESSURE: 85 MMHG | SYSTOLIC BLOOD PRESSURE: 124 MMHG

## 2022-06-02 DIAGNOSIS — S83.92XD SPRAIN OF LEFT KNEE, UNSPECIFIED LIGAMENT, SUBSEQUENT ENCOUNTER: Primary | ICD-10-CM

## 2022-06-02 PROCEDURE — 99213 OFFICE O/P EST LOW 20 MIN: CPT | Performed by: ORTHOPAEDIC SURGERY

## 2022-06-02 ASSESSMENT — ENCOUNTER SYMPTOMS
CHEST TIGHTNESS: 0
SHORTNESS OF BREATH: 0
COLOR CHANGE: 0

## 2022-06-02 NOTE — PROGRESS NOTES
Patient returns to the office with left knee MRI results. Pt stated her knee is actually doing a lot better and the swelling has subsided. Pt stated her pain today is about a 1/10. Pt stated at times she will feel sharp shooting pains on the medial and lateral aspect. Pt has discontinued using the naproxen. MRI of the left knee completed:     Impression   No meniscus or ligament tear.  No chondral lesion.

## 2022-06-02 NOTE — PATIENT INSTRUCTIONS
Continue weight-bearing as tolerated. Continue range of motion exercises as instructed. Ice and elevate as needed. Tylenol or Motrin for pain. Follow up if you would like a steroid injection.

## 2023-07-13 NOTE — PROGRESS NOTES
6/2/2022   Chief Complaint   Patient presents with    Knee Pain     Left        History of Present Illness:                             Jermaine Wasserman is a 42 y.o. female who returns today for follow-up of her left knee. She has noticed improved range of motion and improved swelling in the knee. She has some mild aching pain on occasion but has been able to increase her activities. She has discontinue use of her naproxen because her knee is feeling better. Patient returns to the office with left knee MRI results. Pt stated her knee is actually doing a lot better and the swelling has subsided. Pt stated her pain today is about a 1/10. Pt stated at times she will feel sharp shooting pains on the medial and lateral aspect. Pt has discontinued using the naproxen.            Medical History  Patient's medications, allergies, past medical, surgical, social and family histories were reviewed and updated as appropriate. Review of Systems   Constitutional: Negative for activity change and fever. Respiratory: Negative for chest tightness and shortness of breath. Cardiovascular: Negative for chest pain. Skin: Negative for color change. Neurological: Negative for dizziness. Psychiatric/Behavioral: The patient is not nervous/anxious. Examination:  General Exam:  Vitals: /85   Pulse 72   Resp 16   Ht 5' 5\" (1.651 m)   Wt 183 lb (83 kg)   SpO2 96%   BMI 30.45 kg/m²    Physical Exam   Left lower extremity:  Improved minimal tenderness to palpation across the anterior aspect of the knee at the medial joint line. No joint effusion present. Skin is intact. Range of motion is full with no restrictions in flexion or extension. Mild discomfort at the anterior aspect of the knee during terminal flexion. No instability with active and passive motion of the knee. Patient is ambulating well today without a limp.   Sensation motor functions intact throughout the lower extremity. Diagnostic testing:    MRI images of the left knee were reviewed by myself and discussed with the patient today:    MRI of the left knee completed:      Impression   No meniscus or ligament tear.  No chondral lesion.                 Office Procedures:  No orders of the defined types were placed in this encounter. Assessment and Plan  1. Left knee sprain    2. Left knee effusion, resolved    Her knee has continued to improve with conservative treatments. We reviewed the MRI which showed no structural damage. I recommended that she continue to advance her activities. We discussed the potential for knee aspiration and injection if her effusion returns. Continue with stretching exercises as a home exercise program.  To build up strength as tolerated.     Follow-up as needed            Electronically signed by Jovan Dominguez MD on 6/2/2022 at 9:23 AM Xeljanz Counseling: I discussed with the patient the risks of Xeljanz therapy including increased risk of infection, liver issues, headache, diarrhea, or cold symptoms. Live vaccines should be avoided. They were instructed to call if they have any problems.

## 2024-06-25 ENCOUNTER — HOSPITAL ENCOUNTER (OUTPATIENT)
Dept: INFUSION THERAPY | Age: 44
Discharge: HOME | End: 2024-06-25
Payer: COMMERCIAL

## 2024-06-25 ENCOUNTER — INITIAL CONSULT (OUTPATIENT)
Dept: ONCOLOGY | Age: 44
End: 2024-06-25
Payer: COMMERCIAL

## 2024-06-25 VITALS
OXYGEN SATURATION: 95 % | SYSTOLIC BLOOD PRESSURE: 135 MMHG | DIASTOLIC BLOOD PRESSURE: 75 MMHG | TEMPERATURE: 97.6 F | HEART RATE: 81 BPM | BODY MASS INDEX: 31.19 KG/M2 | WEIGHT: 187.2 LBS | HEIGHT: 65 IN

## 2024-06-25 DIAGNOSIS — D64.9 ANEMIA, UNSPECIFIED TYPE: Primary | ICD-10-CM

## 2024-06-25 DIAGNOSIS — D50.8 OTHER IRON DEFICIENCY ANEMIA: ICD-10-CM

## 2024-06-25 DIAGNOSIS — D64.9 ANEMIA, UNSPECIFIED TYPE: ICD-10-CM

## 2024-06-25 LAB
BASOPHILS ABSOLUTE: 0 K/CU MM
BASOPHILS RELATIVE PERCENT: 0.5 % (ref 0–1)
DIFFERENTIAL TYPE: ABNORMAL
EOSINOPHILS ABSOLUTE: 0.2 K/CU MM
EOSINOPHILS RELATIVE PERCENT: 2.4 % (ref 0–3)
FERRITIN: 51 NG/ML (ref 15–150)
HCT VFR BLD CALC: 44.1 % (ref 37–47)
HEMOGLOBIN: 14.5 GM/DL (ref 12.5–16)
IRON: 58 UG/DL (ref 37–145)
LYMPHOCYTES ABSOLUTE: 2.8 K/CU MM
LYMPHOCYTES RELATIVE PERCENT: 34.4 % (ref 24–44)
MCH RBC QN AUTO: 28.2 PG (ref 27–31)
MCHC RBC AUTO-ENTMCNC: 32.9 % (ref 32–36)
MCV RBC AUTO: 85.6 FL (ref 78–100)
MONOCYTES ABSOLUTE: 0.7 K/CU MM
MONOCYTES RELATIVE PERCENT: 8.1 % (ref 0–4)
NEUTROPHILS ABSOLUTE: 4.4 K/CU MM
NEUTROPHILS RELATIVE PERCENT: 54.6 % (ref 36–66)
PCT TRANSFERRIN: 17 % (ref 10–44)
PDW BLD-RTO: 18.6 % (ref 11.7–14.9)
PLATELET # BLD: 207 K/CU MM (ref 140–440)
PMV BLD AUTO: 10.5 FL (ref 7.5–11.1)
RBC # BLD: 5.15 M/CU MM (ref 4.2–5.4)
TOTAL IRON BINDING CAPACITY: 344 UG/DL (ref 250–450)
UNSATURATED IRON BINDING CAPACITY: 286 UG/DL (ref 110–370)
WBC # BLD: 8 K/CU MM (ref 4–10.5)

## 2024-06-25 PROCEDURE — 82728 ASSAY OF FERRITIN: CPT

## 2024-06-25 PROCEDURE — 83540 ASSAY OF IRON: CPT

## 2024-06-25 PROCEDURE — 99204 OFFICE O/P NEW MOD 45 MIN: CPT | Performed by: INTERNAL MEDICINE

## 2024-06-25 PROCEDURE — 83550 IRON BINDING TEST: CPT

## 2024-06-25 PROCEDURE — 85025 COMPLETE CBC W/AUTO DIFF WBC: CPT

## 2024-06-25 PROCEDURE — 36415 COLL VENOUS BLD VENIPUNCTURE: CPT

## 2024-06-25 PROCEDURE — 99211 OFF/OP EST MAY X REQ PHY/QHP: CPT

## 2024-06-25 RX ORDER — NAPROXEN 500 MG/1
500 TABLET ORAL 2 TIMES DAILY PRN
COMMUNITY
Start: 2024-06-18

## 2024-06-25 RX ORDER — ALBUTEROL SULFATE 90 UG/1
2 AEROSOL, METERED RESPIRATORY (INHALATION) EVERY 6 HOURS PRN
COMMUNITY
Start: 2024-04-10

## 2024-06-25 RX ORDER — FERROUS GLUCONATE 324(37.5)
TABLET ORAL
COMMUNITY
Start: 2024-06-01

## 2024-06-25 RX ORDER — EPINEPHRINE 0.3 MG/.3ML
INJECTION SUBCUTANEOUS
COMMUNITY
Start: 2024-04-09

## 2024-06-25 ASSESSMENT — PATIENT HEALTH QUESTIONNAIRE - PHQ9
SUM OF ALL RESPONSES TO PHQ QUESTIONS 1-9: 0
2. FEELING DOWN, DEPRESSED OR HOPELESS: NOT AT ALL
1. LITTLE INTEREST OR PLEASURE IN DOING THINGS: NOT AT ALL
SUM OF ALL RESPONSES TO PHQ QUESTIONS 1-9: 0
SUM OF ALL RESPONSES TO PHQ QUESTIONS 1-9: 0
SUM OF ALL RESPONSES TO PHQ9 QUESTIONS 1 & 2: 0
SUM OF ALL RESPONSES TO PHQ QUESTIONS 1-9: 0

## 2024-06-25 NOTE — PROGRESS NOTES
MA Rooming Questions  Patient: Anjana Alatorre  MRN: [id number]    Date: 6/25/2024        NEW PATIENT     5. Did the patient have a depression screening completed today? Yes    PHQ-9 Total Score: 0 (6/25/2024  2:32 PM)       PHQ-9 Given to (if applicable):               PHQ-9 Score (if applicable):                     [] Positive     []  Negative              Does question #9 need addressed (if applicable)                     [] Yes    []  No               Katia Polanco, CMA      
06/25/2024    LYMPHOPCT 34.4 06/25/2024    MONOPCT 8.1 (H) 06/25/2024    EOSABS 0.2 06/25/2024    BASOSABS 0.0 06/25/2024    MONOSABS 0.7 06/25/2024    DIFFTYPE AUTOMATED DIFFERENTIAL 06/25/2024   Chemistry:  Lab Results   Component Value Date     06/06/2019    K 4.0 06/06/2019     06/06/2019    CO2 24 06/06/2019    BUN 12 06/06/2019    CREATININE 0.6 06/06/2019    GLUCOSE 95 06/06/2019    CALCIUM 9.2 06/06/2019    BILITOT 0.1 06/06/2019    ALKPHOS 83 06/06/2019    AST 11 (L) 06/06/2019    ALT 13 06/06/2019    LABGLOM >60 06/06/2019    GFRAA >60 06/06/2019     Observations:  PHQ-9 Total Score: 0 (6/25/2024  2:32 PM)     Assessment & Plan:  1. She was referred for evaluation of PAM likely related to heavy menstruation.  She is on oral Iron TID for ~ one month.  6/26/2024 labs today including iron study. Advise to call for result.  Will offer iron infusion if she does not respond to oral iron.    2. She has heavy and prolonged menstruation. FU with gyn at [facility] who recommend hormonal therapy vs hysterectomy.    3. Reports she has mammogram in 5/2024.    4. Refer to GI for endoscopic study including colonoscopy.    RTC in 6 - 7 weeks or sooner.    I have discussed the above stated plan with the patient and they verbalized understanding and agreed with the plan. Thank you for allowing us to participate in this patient's care.

## 2024-07-05 ENCOUNTER — PROCEDURE VISIT (OUTPATIENT)
Dept: PHYSICAL MEDICINE AND REHAB | Age: 44
End: 2024-07-05
Payer: COMMERCIAL

## 2024-07-05 DIAGNOSIS — M25.532 CHRONIC PAIN OF LEFT WRIST: ICD-10-CM

## 2024-07-05 DIAGNOSIS — G56.22 ULNAR NEUROPATHY AT ELBOW, LEFT: Primary | ICD-10-CM

## 2024-07-05 DIAGNOSIS — G56.01 CARPAL TUNNEL SYNDROME OF RIGHT WRIST: ICD-10-CM

## 2024-07-05 DIAGNOSIS — M79.602 PARESTHESIA AND PAIN OF BOTH UPPER EXTREMITIES: ICD-10-CM

## 2024-07-05 DIAGNOSIS — R20.2 PARESTHESIA AND PAIN OF BOTH UPPER EXTREMITIES: ICD-10-CM

## 2024-07-05 DIAGNOSIS — G89.29 CHRONIC PAIN OF LEFT WRIST: ICD-10-CM

## 2024-07-05 DIAGNOSIS — M79.601 PARESTHESIA AND PAIN OF BOTH UPPER EXTREMITIES: ICD-10-CM

## 2024-07-05 PROCEDURE — 95911 NRV CNDJ TEST 9-10 STUDIES: CPT | Performed by: PHYSICAL MEDICINE & REHABILITATION

## 2024-07-05 PROCEDURE — 95886 MUSC TEST DONE W/N TEST COMP: CPT | Performed by: PHYSICAL MEDICINE & REHABILITATION

## 2024-07-05 NOTE — PROGRESS NOTES
EMG REPORT     CHIEF COMPLAINT: Painful left wrist.    HISTORY OF PRESENT ILLNESS: 43 y.o. right hand dominant female with a lingering (and recently worsening) left wrist pain at the base of her left thumb.  She gets numbness and tingling in the long and ring fingers of her left hand.  Her sleep is bothered by the symptoms.  She occasionally drops things.  She reports having a stress fracture in the left wrist that has caused lingering pain.  She does not sense neck pain radiating towards her hand.  She denied having similar symptoms in her right hand.  She rated the pain severity as 8/10.  She has no history of diabetes or any thyroid disorder.      PHYSICAL EXAMINATION: Alert.  About 60 degrees of active cervical spine rotation of the right and 70 degrees to the left.  Spurling's maneuver was negative.  Upper limb reflexes were trace and symmetric.  Tinel sign is negative.  She had exquisite tenderness to palpation in the distal left forearm, left wrist and at the base of the left thumb.  Manual muscle testing was complicated by giveaway with pain with thumb opposition, digit abduction and power .  Proximal strength was intact.  Her right thumb opposition MMT was 4/5 only.  Digit abduction was 5/5 on the right.  Sensation was inconsistently reported in the left upper limb.      NERVE CONDUCTION STUDIES:     MOTOR         LATENCY NORMAL AMPLITUDE DISTANCE COND. VENUS.   RIGHT  MEDIAN 3.7 < 4.2 msec 4.5 8 cm >50   LEFT  MEDIAN 3.0 < 4.2 msec 4.3 8 cm 53   RIGHT  ULNAR 2.8 < 4.2 msec 8.3/7.4 8 cm 59/45   LEFT  ULNAR 2.8 < 4.2 msec 6.3/4.4 8 cm 71/43      SENSORY  ORTHODROMIC        LATENCY NORMAL AMPLITUDE DISTANCE   RIGHT MEDIAN 2.7 <2.3 msec 40 10 cm   LEFT  MEDIAN 2.3 < 2.3 msec 30 10 cm   RIGHT  ULNAR 2.1 < 2.3 msec 17 10 cm   LEFT  ULNAR 2.1 < 2.3 msec 20 10 cm       Left dorsal ulnar sensory: dL 2.2 msec, amp 22 microV.        NEEDLE EMG:      RIGHT   LEFT     Insertional Activity Spontaneous  Activity

## 2024-07-07 NOTE — PROGRESS NOTES
Patient Name:  Anjana Alatorre  Patient :  1980  Patient MRN:  [id number]     Primary Oncologist: Saima Lara MD  Referring Provider: Not, On File (Inactive)     Date of Service: 2024      Reason for Consult:  PAM      Chief Complaint:    Chief Complaint   Patient presents with    Follow-up    Results        Patient Active Problem List:     Effusion, left knee     Sprain of left knee     HPI:   Anjana Stone is a pleasant 44 yo female patient who was referred for evaluation of PAM.   She has prolonged and heavy menstruation. Menstruation lasts ~ 7 - 8 days and she has 5 - 6D of heavy menstruation.  Seen by gyn at [facility] who offered hormonal therapy vs hysterectomy.  2019 CMP grossly unremarkable. WBC 9.8, Hg 11.4, MCV 76.5, plat 268.  2024 TSH wnl. CMP grossly unremarkable. WBC 6.4, Hg 13.5, MCV 76.9. plat 205. Iron 44, TIBC 491, sat 9%, ferritin 6.5.  She has been taking oral Iron TID since May 2024. I recommend to limit use of the naproxen.  No prior h/o blood transfusion or iron infusion.  2024 labs today including iron study. Advise to call for  result.  She has cold hands and feet. + palpitation.   We discuss about possible iron infusion if she does not respond to oral iron. Potential SE of iron infusion discussed with her including allergic reaction.  4 children. 2024 mammogram. No prior colon cancer screening.  She is agreeable to see GI at [facility] for potential endoscopic study.    2024 follow up visit.  2024 WBC 8, Hg 14.5, MCV 85.6, plat 207. Ferritin 51, Iron 58, TIBC 344. Sat 17.   She responds well to oral iron.  Repeat labs prior to next OV.  Potential partial hysterectomy d/t menorrhagia once approved by insurance.  No acute pain.  Denies any nausea, vomiting or diarrhea.  No fever or chills.  No chest pain, shortness of breath or palpitation.  No headache or dizzy spell.  No specific bone pain.  No melena or hematochezia.  Denied any dysuria or

## 2024-08-06 ENCOUNTER — OFFICE VISIT (OUTPATIENT)
Dept: ONCOLOGY | Age: 44
End: 2024-08-06
Payer: COMMERCIAL

## 2024-08-06 ENCOUNTER — HOSPITAL ENCOUNTER (OUTPATIENT)
Dept: INFUSION THERAPY | Age: 44
Discharge: HOME | End: 2024-08-06
Payer: MEDICAID

## 2024-08-06 VITALS
TEMPERATURE: 97.8 F | BODY MASS INDEX: 31.72 KG/M2 | DIASTOLIC BLOOD PRESSURE: 79 MMHG | OXYGEN SATURATION: 92 % | WEIGHT: 190.4 LBS | HEART RATE: 77 BPM | HEIGHT: 65 IN | SYSTOLIC BLOOD PRESSURE: 139 MMHG

## 2024-08-06 DIAGNOSIS — D64.9 ANEMIA, UNSPECIFIED TYPE: Primary | ICD-10-CM

## 2024-08-06 PROCEDURE — 99213 OFFICE O/P EST LOW 20 MIN: CPT | Performed by: INTERNAL MEDICINE

## 2024-08-06 PROCEDURE — 99211 OFF/OP EST MAY X REQ PHY/QHP: CPT

## 2024-08-06 NOTE — PROGRESS NOTES
MA Rooming Questions  Patient: Anjana Alatorre  MRN: [id number]    Date: 8/6/2024        1. Do you have any new issues?   no         2. Do you need any refills on medications?    no    3. Have you had any imaging done since your last visit?   no    4. Have you been hospitalized or seen in the emergency room since your last visit here?   no    5. Did the patient have a depression screening completed today? No    No data recorded     PHQ-9 Given to (if applicable):               PHQ-9 Score (if applicable):                     [] Positive     []  Negative              Does question #9 need addressed (if applicable)                     [] Yes    []  No               Katia Polanco CMA

## 2024-09-30 ENCOUNTER — HOSPITAL ENCOUNTER (OUTPATIENT)
Dept: INFUSION THERAPY | Age: 44
Discharge: HOME | End: 2024-09-30
Payer: MEDICAID

## 2024-09-30 DIAGNOSIS — D64.9 ANEMIA, UNSPECIFIED TYPE: ICD-10-CM

## 2024-09-30 LAB
BASOPHILS # BLD: 0.02 K/UL
BASOPHILS NFR BLD: 0 % (ref 0–1)
EOSINOPHIL # BLD: 0.39 K/UL
EOSINOPHILS RELATIVE PERCENT: 4 % (ref 0–3)
ERYTHROCYTE [DISTWIDTH] IN BLOOD BY AUTOMATED COUNT: 14.3 % (ref 11.7–14.9)
FERRITIN SERPL-MCNC: 84 NG/ML (ref 15–150)
HCT VFR BLD AUTO: 46.8 % (ref 37–47)
HGB BLD-MCNC: 15.6 G/DL (ref 12.5–16)
IRON SATN MFR SERPL: 31 % (ref 15–50)
IRON SERPL-MCNC: 109 UG/DL (ref 37–145)
LYMPHOCYTES NFR BLD: 1.75 K/UL
LYMPHOCYTES RELATIVE PERCENT: 20 % (ref 24–44)
MCH RBC QN AUTO: 29.6 PG (ref 27–31)
MCHC RBC AUTO-ENTMCNC: 33.3 G/DL (ref 32–36)
MCV RBC AUTO: 88.8 FL (ref 78–100)
MONOCYTES NFR BLD: 0.36 K/UL
MONOCYTES NFR BLD: 4 % (ref 0–4)
NEUTROPHILS NFR BLD: 72 % (ref 36–66)
NEUTS SEG NFR BLD: 6.42 K/UL
PLATELET # BLD AUTO: 257 K/UL (ref 140–440)
PMV BLD AUTO: 11.2 FL (ref 7.5–11.1)
RBC # BLD AUTO: 5.27 M/UL (ref 4.2–5.4)
TIBC SERPL-MCNC: 349 UG/DL (ref 260–445)
UNSATURATED IRON BINDING CAPACITY: 240 UG/DL (ref 110–370)
WBC OTHER # BLD: 8.9 K/UL (ref 4–10.5)

## 2024-09-30 PROCEDURE — 36415 COLL VENOUS BLD VENIPUNCTURE: CPT

## 2024-09-30 PROCEDURE — 82728 ASSAY OF FERRITIN: CPT

## 2024-09-30 PROCEDURE — 85025 COMPLETE CBC W/AUTO DIFF WBC: CPT

## 2024-09-30 PROCEDURE — 83550 IRON BINDING TEST: CPT

## 2024-09-30 PROCEDURE — 83540 ASSAY OF IRON: CPT

## 2024-10-07 ENCOUNTER — OFFICE VISIT (OUTPATIENT)
Dept: ONCOLOGY | Age: 44
End: 2024-10-07
Payer: MEDICAID

## 2024-10-07 ENCOUNTER — HOSPITAL ENCOUNTER (OUTPATIENT)
Dept: INFUSION THERAPY | Age: 44
Discharge: HOME | End: 2024-10-07
Payer: MEDICAID

## 2024-10-07 VITALS
HEART RATE: 84 BPM | BODY MASS INDEX: 30.06 KG/M2 | TEMPERATURE: 97.8 F | SYSTOLIC BLOOD PRESSURE: 131 MMHG | WEIGHT: 180.4 LBS | DIASTOLIC BLOOD PRESSURE: 77 MMHG | OXYGEN SATURATION: 96 % | HEIGHT: 65 IN | RESPIRATION RATE: 16 BRPM

## 2024-10-07 DIAGNOSIS — D64.9 ANEMIA, UNSPECIFIED TYPE: Primary | ICD-10-CM

## 2024-10-07 PROCEDURE — 99211 OFF/OP EST MAY X REQ PHY/QHP: CPT

## 2024-10-07 PROCEDURE — 99213 OFFICE O/P EST LOW 20 MIN: CPT | Performed by: INTERNAL MEDICINE

## 2024-10-07 NOTE — PROGRESS NOTES
MA Rooming Questions  Patient: Anjana Alatorre  MRN: [id number]    Date: 10/7/2024        1. Do you have any new issues?   no         2. Do you need any refills on medications?    no    3. Have you had any imaging done since your last visit?   no    4. Have you been hospitalized or seen in the emergency room since your last visit here?   yes - [facility] for surgery; hysterectomy     5. Did the patient have a depression screening completed today? No    No data recorded     PHQ-9 Given to (if applicable):               PHQ-9 Score (if applicable):                     [] Positive     []  Negative              Does question #9 need addressed (if applicable)                     [] Yes    []  No               Collette Strickland MA

## 2024-10-14 ENCOUNTER — TELEPHONE (OUTPATIENT)
Dept: GASTROENTEROLOGY | Age: 44
End: 2024-10-14

## 2024-10-22 ENCOUNTER — PREP FOR PROCEDURE (OUTPATIENT)
Dept: GASTROENTEROLOGY | Age: 44
End: 2024-10-22

## 2024-10-22 ENCOUNTER — OFFICE VISIT (OUTPATIENT)
Dept: GASTROENTEROLOGY | Age: 44
End: 2024-10-22
Payer: MEDICAID

## 2024-10-22 VITALS
SYSTOLIC BLOOD PRESSURE: 120 MMHG | HEIGHT: 65 IN | OXYGEN SATURATION: 96 % | BODY MASS INDEX: 30.06 KG/M2 | WEIGHT: 180.4 LBS | DIASTOLIC BLOOD PRESSURE: 74 MMHG | HEART RATE: 83 BPM | RESPIRATION RATE: 16 BRPM

## 2024-10-22 DIAGNOSIS — D64.9 ANEMIA, UNSPECIFIED TYPE: Primary | ICD-10-CM

## 2024-10-22 DIAGNOSIS — R11.0 NAUSEA: ICD-10-CM

## 2024-10-22 DIAGNOSIS — R12 HEARTBURN: ICD-10-CM

## 2024-10-22 DIAGNOSIS — R19.4 ALTERED BOWEL HABITS: ICD-10-CM

## 2024-10-22 PROCEDURE — 99204 OFFICE O/P NEW MOD 45 MIN: CPT | Performed by: NURSE PRACTITIONER

## 2024-10-22 RX ORDER — SODIUM CHLORIDE 9 MG/ML
INJECTION, SOLUTION INTRAVENOUS PRN
Status: CANCELLED | OUTPATIENT
Start: 2024-10-22

## 2024-10-22 RX ORDER — SODIUM CHLORIDE, SODIUM LACTATE, POTASSIUM CHLORIDE, CALCIUM CHLORIDE 600; 310; 30; 20 MG/100ML; MG/100ML; MG/100ML; MG/100ML
INJECTION, SOLUTION INTRAVENOUS CONTINUOUS
Status: CANCELLED | OUTPATIENT
Start: 2024-10-22

## 2024-10-22 RX ORDER — POLYETHYLENE GLYCOL 3350, SODIUM SULFATE ANHYDROUS, SODIUM BICARBONATE, SODIUM CHLORIDE, POTASSIUM CHLORIDE 236; 22.74; 6.74; 5.86; 2.97 G/4L; G/4L; G/4L; G/4L; G/4L
4 POWDER, FOR SOLUTION ORAL ONCE
Qty: 4000 ML | Refills: 0 | Status: SHIPPED | OUTPATIENT
Start: 2024-10-22 | End: 2024-10-22

## 2024-10-22 RX ORDER — SODIUM CHLORIDE 0.9 % (FLUSH) 0.9 %
5-40 SYRINGE (ML) INJECTION EVERY 12 HOURS SCHEDULED
Status: CANCELLED | OUTPATIENT
Start: 2024-10-22

## 2024-10-22 RX ORDER — SODIUM CHLORIDE 0.9 % (FLUSH) 0.9 %
5-40 SYRINGE (ML) INJECTION PRN
Status: CANCELLED | OUTPATIENT
Start: 2024-10-22

## 2024-10-22 ASSESSMENT — ENCOUNTER SYMPTOMS
DIARRHEA: 0
COLOR CHANGE: 0
BLOOD IN STOOL: 0
NAUSEA: 1
PHOTOPHOBIA: 0
ABDOMINAL PAIN: 0
COUGH: 0
CONSTIPATION: 0
SHORTNESS OF BREATH: 0
BACK PAIN: 1
VOMITING: 0
EYE PAIN: 0

## 2024-10-22 NOTE — PROGRESS NOTES
Anjana Alatorre 44 y.o. female was seen by VINICIUS Garsia on 10/22/2024     Wt Readings from Last 3 Encounters:   10/22/24 81.8 kg (180 lb 6.4 oz)   10/07/24 81.8 kg (180 lb 6.4 oz)   08/06/24 86.4 kg (190 lb 6.4 oz)       HPI  Anjana Alatorre is a pleasant 44 y.o.  female who presents today for acid reflux, altered bowel habits, anemia, and nausea.  She has a past medical history of asthma and kidney stone.  She denies NSAID use.  She smokes less than a pack of cigarettes per day.  She was referred by Dr. Lara for anemia.  She was told she had anemia in 2013 and is on iron supplements.  She has never had a EGD or colonoscopy.  Per chart review lab work on 6-6-2019 showed RBC 5.10, Hgb 11.4, Hct 39, MCV 76.5, and Platelets 268.  Her CBC on 9- showed RBC 5.27, Hgb 15.6, Hct 46.8, MCV 88.8 and Platelets 257.  Her appetite is fair with early satiety.  Her weight is stable.  Intermittent nausea occurs once to twice a week.  No vomiting.  No abdomianl pain, bloating or distention.  Her GERD has been ongoing for over ten years.  Tums helps a little and takes two to times a week.  Nocturnal awakenings with acid reflux once to twice a week.  No dysphagia or pain with swallowing.   She has excess belching and flatulence.  Her bowel pattern has changed since her hysterectomy four weeks ago having more constipation.  Colace helps.  Her typical bowel pattern is twice a week with soft mushy to brown formed stools.  Fluctuations of constipation and diarrhea.  Her last episode of  diarrhea was middle of last week.  No blood in her stools or melena.  No family history of stomach or colon cancer.      ROS  Review of Systems   Constitutional:  Negative for appetite change, chills, diaphoresis, fatigue and fever.   HENT:  Negative for ear pain, hearing loss and tinnitus.    Eyes:  Positive for visual disturbance. Negative for photophobia and pain.   Respiratory:  Negative for cough and shortness of breath.

## 2024-12-03 RX ORDER — METOPROLOL SUCCINATE 25 MG/1
25 TABLET, EXTENDED RELEASE ORAL 2 TIMES DAILY
COMMUNITY

## 2024-12-03 NOTE — PROGRESS NOTES
Office contacted via TEAMS for cardiac clearance    Surgery @ [facility] on 12/10/24 you will be called 12/9/24 with times    FOLLOW THE OFFICE INSTRUCTIONS FOR YOUR BOWEL PREP    1. Enter thru the hospital main entrance on day of surgery, check in at the Information Desk. If you arrive prior to 6:00am, enter thru the ER entrance.    2. Follow the directions as prescribed by the doctor for your procedure and medications.         Morning of surgery take:  Omeprazole with sips of water         Stop vitamins, supplements and NSAIDS:  12/3/24    3. Check with your Doctor regarding stopping blood thinners and follow their instructions.    4. Do not smoke, vape or use chewing tobacco morning of surgery. Do not drink any alcoholic beverages 24 hours prior to surgery.       This includes NA Beer. No street drugs 7 days prior to surgery.    5. If you have dentures, contacts of glasses they will be removed before going to the OR; please bring a case.    6. Please bring picture ID, insurance card, paperwork from the doctor’s office (H & P, Consent, & card for implantable devices).    7. Take a shower with an antibacterial soap the night before surgery and the morning of surgery. Do not put anything on your skin      After your morning shower.    8. You will need a responsible adult to drive you home and check on you after surgery.

## 2024-12-09 ENCOUNTER — ANESTHESIA EVENT (OUTPATIENT)
Dept: ENDOSCOPY | Age: 44
End: 2024-12-09
Payer: MEDICAID

## 2024-12-09 ASSESSMENT — LIFESTYLE VARIABLES: SMOKING_STATUS: 1

## 2024-12-09 NOTE — PROGRESS NOTES
Left a message for patient to arrive at 1000 at [facility] on 12/10/2024 for her procedure at 1130. Also spoke to patients  Aroldo and gave him new arrival time of 1000 for procedure a t 1130 and prep time change for second half of prep 0530 and clear liquid until 0730 then nothing else.

## 2024-12-09 NOTE — ANESTHESIA PRE PROCEDURE
Department of Anesthesiology  Preprocedure Note       Name:  Anjana Alatorre   Age:  44 y.o.  :  1980                                          MRN:  [id number]         Date:  2024      Surgeon: Surgeon(s):  Angeline Canchola MD    Procedure: Procedure(s):  COLONOSCOPY DIAGNOSTIC  ESOPHAGOGASTRODUODENOSCOPY BIOPSY    Medications prior to admission:   Prior to Admission medications    Medication Sig Start Date End Date Taking? Authorizing Provider   metoprolol succinate (TOPROL XL) 25 MG extended release tablet Take 1 tablet by mouth in the morning and at bedtime   Yes Provider, MD Riya   omeprazole (PRILOSEC) 20 MG delayed release capsule Take 1 capsule by mouth daily Take on an empty stomach before breakfast 10/22/24 2/19/25  Kindra Hutson, APRN - CNP   albuterol sulfate HFA (PROVENTIL;VENTOLIN;PROAIR) 108 (90 Base) MCG/ACT inhaler Inhale 2 puffs into the lungs every 6 hours as needed for Wheezing for wheezing 4/10/24   Tal Marcus DO   EPINEPHrine (EPIPEN) 0.3 MG/0.3ML SOAJ injection INJECT 0.3 MLS INTO THE MUSCLE AS NEEDED FOR ANAPHYLAXIS 24   Tal Marcus DO   ferrous gluconate 324 (37.5 Fe) MG TABS  24   Tal Marcus DO   naproxen (NAPROSYN) 500 MG tablet Take 1 tablet by mouth 2 times daily as needed  Patient not taking: Reported on 12/3/2024 6/18/24   Nicolasa Aguirre PA       Current medications:    No current facility-administered medications for this encounter.     Current Outpatient Medications   Medication Sig Dispense Refill   • metoprolol succinate (TOPROL XL) 25 MG extended release tablet Take 1 tablet by mouth in the morning and at bedtime     • omeprazole (PRILOSEC) 20 MG delayed release capsule Take 1 capsule by mouth daily Take on an empty stomach before breakfast 30 capsule 3   • albuterol sulfate HFA (PROVENTIL;VENTOLIN;PROAIR) 108 (90 Base) MCG/ACT inhaler Inhale 2 puffs into the lungs every 6 hours as needed for Wheezing for wheezing     • EPINEPHrine

## 2024-12-10 ENCOUNTER — HOSPITAL ENCOUNTER (OUTPATIENT)
Age: 44
Setting detail: OUTPATIENT SURGERY
Discharge: HOME | End: 2024-12-10
Attending: INTERNAL MEDICINE | Admitting: INTERNAL MEDICINE
Payer: MEDICAID

## 2024-12-10 ENCOUNTER — ANESTHESIA (OUTPATIENT)
Dept: ENDOSCOPY | Age: 44
End: 2024-12-10
Payer: MEDICAID

## 2024-12-10 VITALS
HEIGHT: 65 IN | RESPIRATION RATE: 16 BRPM | SYSTOLIC BLOOD PRESSURE: 122 MMHG | DIASTOLIC BLOOD PRESSURE: 79 MMHG | HEART RATE: 74 BPM | WEIGHT: 180 LBS | TEMPERATURE: 97.6 F | OXYGEN SATURATION: 97 % | BODY MASS INDEX: 29.99 KG/M2

## 2024-12-10 DIAGNOSIS — R11.0 NAUSEA: ICD-10-CM

## 2024-12-10 DIAGNOSIS — D64.9 ANEMIA: ICD-10-CM

## 2024-12-10 DIAGNOSIS — R12 HEARTBURN: ICD-10-CM

## 2024-12-10 DIAGNOSIS — R19.4 ALTERED BOWEL HABITS: ICD-10-CM

## 2024-12-10 PROCEDURE — 2709999900 HC NON-CHARGEABLE SUPPLY: Performed by: INTERNAL MEDICINE

## 2024-12-10 PROCEDURE — 3609013500 HC EGD REMOVAL TUMOR POLYP/OTHER LESION SNARE TECH: Performed by: INTERNAL MEDICINE

## 2024-12-10 PROCEDURE — 88342 IMHCHEM/IMCYTCHM 1ST ANTB: CPT | Performed by: PATHOLOGY

## 2024-12-10 PROCEDURE — 7100000010 HC PHASE II RECOVERY - FIRST 15 MIN: Performed by: INTERNAL MEDICINE

## 2024-12-10 PROCEDURE — 7100000011 HC PHASE II RECOVERY - ADDTL 15 MIN: Performed by: INTERNAL MEDICINE

## 2024-12-10 PROCEDURE — 6360000002 HC RX W HCPCS

## 2024-12-10 PROCEDURE — 3609010600 HC COLONOSCOPY POLYPECTOMY SNARE/COLD BIOPSY: Performed by: INTERNAL MEDICINE

## 2024-12-10 PROCEDURE — 3700000000 HC ANESTHESIA ATTENDED CARE: Performed by: INTERNAL MEDICINE

## 2024-12-10 PROCEDURE — 2580000003 HC RX 258

## 2024-12-10 PROCEDURE — 3700000001 HC ADD 15 MINUTES (ANESTHESIA): Performed by: INTERNAL MEDICINE

## 2024-12-10 PROCEDURE — 88305 TISSUE EXAM BY PATHOLOGIST: CPT | Performed by: PATHOLOGY

## 2024-12-10 RX ORDER — SODIUM CHLORIDE 9 MG/ML
INJECTION, SOLUTION INTRAVENOUS
Status: DISCONTINUED | OUTPATIENT
Start: 2024-12-10 | End: 2024-12-10 | Stop reason: SDUPTHER

## 2024-12-10 RX ORDER — LIDOCAINE HYDROCHLORIDE 20 MG/ML
INJECTION, SOLUTION EPIDURAL; INFILTRATION; INTRACAUDAL; PERINEURAL
Status: DISCONTINUED | OUTPATIENT
Start: 2024-12-10 | End: 2024-12-10 | Stop reason: SDUPTHER

## 2024-12-10 RX ORDER — PROPOFOL 10 MG/ML
INJECTION, EMULSION INTRAVENOUS
Status: DISCONTINUED | OUTPATIENT
Start: 2024-12-10 | End: 2024-12-10 | Stop reason: SDUPTHER

## 2024-12-10 RX ADMIN — LIDOCAINE HYDROCHLORIDE 110 MG: 20 INJECTION, SOLUTION EPIDURAL; INFILTRATION; INTRACAUDAL; PERINEURAL at 12:08

## 2024-12-10 RX ADMIN — PROPOFOL 400 MG: 10 INJECTION, EMULSION INTRAVENOUS at 12:08

## 2024-12-10 RX ADMIN — SODIUM CHLORIDE: 9 INJECTION, SOLUTION INTRAVENOUS at 12:00

## 2024-12-10 ASSESSMENT — PAIN - FUNCTIONAL ASSESSMENT
PAIN_FUNCTIONAL_ASSESSMENT: 0-10
PAIN_FUNCTIONAL_ASSESSMENT: 0-10

## 2024-12-10 ASSESSMENT — LIFESTYLE VARIABLES: SMOKING_STATUS: 1

## 2024-12-10 ASSESSMENT — PAIN SCALES - GENERAL: PAINLEVEL_OUTOF10: 0

## 2024-12-10 NOTE — ANESTHESIA PRE PROCEDURE
Department of Anesthesiology  Preprocedure Note       Name:  Anjana Alatorre   Age:  44 y.o.  :  1980                                          MRN:  [id number]         Date:  12/10/2024      Surgeon: Surgeon(s):  Angeline Canchola MD    Procedure: Procedure(s):  COLONOSCOPY DIAGNOSTIC  ESOPHAGOGASTRODUODENOSCOPY BIOPSY    Medications prior to admission:   Prior to Admission medications    Medication Sig Start Date End Date Taking? Authorizing Provider   metoprolol succinate (TOPROL XL) 25 MG extended release tablet Take 1 tablet by mouth in the morning and at bedtime   Yes Provider, MD Riya   omeprazole (PRILOSEC) 20 MG delayed release capsule Take 1 capsule by mouth daily Take on an empty stomach before breakfast 10/22/24 2/19/25 Yes Kindra Hutson APRN - CNP   albuterol sulfate HFA (PROVENTIL;VENTOLIN;PROAIR) 108 (90 Base) MCG/ACT inhaler Inhale 2 puffs into the lungs every 6 hours as needed for Wheezing for wheezing 4/10/24  Yes Tal Marcus DO   ferrous gluconate 324 (37.5 Fe) MG TABS  24  Yes Tal Marcus DO   EPINEPHrine (EPIPEN) 0.3 MG/0.3ML SOAJ injection INJECT 0.3 MLS INTO THE MUSCLE AS NEEDED FOR ANAPHYLAXIS 24   Tal Marcus DO   naproxen (NAPROSYN) 500 MG tablet Take 1 tablet by mouth 2 times daily as needed  Patient not taking: Reported on 12/3/2024 6/18/24   Nicolasa Aguirre PA       Current medications:    No current facility-administered medications for this encounter.       Allergies:    Allergies   Allergen Reactions    Latex      Swelling where it touches me    Penicillins Anaphylaxis     I stopped breathing       Problem List:    Patient Active Problem List   Diagnosis Code    Effusion, left knee M25.462    Sprain of left knee S83.92XA    Anemia D64.9    Nausea R11.0    Heartburn R12    Altered bowel habits R19.4       Past Medical History:        Diagnosis Date    A-fib ([facility])     Wei Mcgill MD    Anemia     Asthma     GERD (gastroesophageal

## 2024-12-10 NOTE — ANESTHESIA POSTPROCEDURE EVALUATION
Department of Anesthesiology  Postprocedure Note    Patient: Anjana Alatorre  MRN: [id number]  YOB: 1980  Date of evaluation: 12/10/2024    Procedure Summary       Date: 12/10/24 Room / Location: [place] [id number] / [facility]    Anesthesia Start: 1200 Anesthesia Stop: 1249    Procedures:       COLONOSCOPY POLYPECTOMY SNARE/BIOPSY      ESOPHAGOGASTRODUODENOSCOPY POLYP SNARE Diagnosis:       Anemia      Nausea      Heartburn      Altered bowel habits      (Anemia [D64.9])      (Nausea [R11.0])      (Heartburn [R12])      (Altered bowel habits [R19.4])    Surgeons: Angeline Canchola MD Responsible Provider: Js Baumann MD    Anesthesia Type: MAC ASA Status: 3            Anesthesia Type: MAC    Nithya Phase I: Nithya Score: 10    Nithya Phase II:      Anesthesia Post Evaluation    Patient location during evaluation: bedside  Patient participation: complete - patient participated  Level of consciousness: awake  Pain score: 0  Airway patency: patent  Nausea & Vomiting: no nausea and no vomiting  Cardiovascular status: hemodynamically stable  Respiratory status: room air and spontaneous ventilation  Hydration status: euvolemic  Pain management: adequate    There were no known notable events for this encounter.

## 2024-12-11 NOTE — PROGRESS NOTES
1252 Pt received from [place] and report received from [facility]. Pt denies c/o. Dr. Canchola in to talk to pt and . Pt given beverage. Call light in reach. 1305 In to check on pt. Pt denies c/o or needs. Call light in reach. 1320 In to check on pt. Pt denies c/o or needs. Call light in reach.1335 Pt up to side of bed. Pt tolerated well and ready to get dressed to go home. Call light in reach. 1340 Pt discharged to home per wheelchair via [facility].

## 2024-12-13 LAB — SURGICAL PATHOLOGY REPORT: NORMAL

## 2025-01-03 ENCOUNTER — HOSPITAL ENCOUNTER (OUTPATIENT)
Dept: INFUSION THERAPY | Age: 45
Discharge: HOME | End: 2025-01-03
Payer: MEDICAID

## 2025-01-03 DIAGNOSIS — D64.9 ANEMIA, UNSPECIFIED TYPE: ICD-10-CM

## 2025-01-03 LAB
BASOPHILS # BLD: 0.05 K/UL
BASOPHILS NFR BLD: 1 % (ref 0–1)
EOSINOPHIL # BLD: 0.23 K/UL
EOSINOPHILS RELATIVE PERCENT: 3 % (ref 0–3)
ERYTHROCYTE [DISTWIDTH] IN BLOOD BY AUTOMATED COUNT: 14.3 % (ref 11.7–14.9)
FERRITIN SERPL-MCNC: 84 NG/ML (ref 15–150)
HCT VFR BLD AUTO: 47.9 % (ref 37–47)
HGB BLD-MCNC: 16.4 G/DL (ref 12.5–16)
IRON SATN MFR SERPL: 12 % (ref 15–50)
IRON SERPL-MCNC: 44 UG/DL (ref 37–145)
LYMPHOCYTES NFR BLD: 2.7 K/UL
LYMPHOCYTES RELATIVE PERCENT: 32 % (ref 24–44)
MCH RBC QN AUTO: 29.7 PG (ref 27–31)
MCHC RBC AUTO-ENTMCNC: 34.2 G/DL (ref 32–36)
MCV RBC AUTO: 86.6 FL (ref 78–100)
MONOCYTES NFR BLD: 0.69 K/UL
MONOCYTES NFR BLD: 8 % (ref 0–4)
NEUTROPHILS NFR BLD: 57 % (ref 36–66)
NEUTS SEG NFR BLD: 4.83 K/UL
PLATELET # BLD AUTO: 234 K/UL (ref 140–440)
PMV BLD AUTO: 10.4 FL (ref 7.5–11.1)
RBC # BLD AUTO: 5.53 M/UL (ref 4.2–5.4)
TIBC SERPL-MCNC: 369 UG/DL (ref 260–445)
UNSATURATED IRON BINDING CAPACITY: 325 UG/DL (ref 110–370)
WBC OTHER # BLD: 8.5 K/UL (ref 4–10.5)

## 2025-01-03 PROCEDURE — 83540 ASSAY OF IRON: CPT

## 2025-01-03 PROCEDURE — 82728 ASSAY OF FERRITIN: CPT

## 2025-01-03 PROCEDURE — 85025 COMPLETE CBC W/AUTO DIFF WBC: CPT

## 2025-01-03 PROCEDURE — 36415 COLL VENOUS BLD VENIPUNCTURE: CPT

## 2025-01-03 PROCEDURE — 83550 IRON BINDING TEST: CPT

## 2025-01-12 NOTE — PROGRESS NOTES
Patient Name:  Anjana Alatorre  Patient :  1980  Patient MRN:  [id number]     Primary Oncologist: Saima Lara MD  Referring Provider: Not, On File (Inactive)     Date of Service: 2025      Chief Complaint:    Chief Complaint   Patient presents with    Follow-up     FU visit.    Patient Active Problem List:     Effusion, left knee     Sprain of left knee     HPI:   Anjana Stone is a pleasant 43 yo female patient who was referred for evaluation of PAM.   She has prolonged and heavy menstruation. Menstruation lasts ~ 7 - 8 days and she has 5 - 6D of heavy menstruation.  Seen by gyn at [place] who offered hormonal therapy vs hysterectomy.  2019 CMP grossly unremarkable. WBC 9.8, Hg 11.4, MCV 76.5, plat 268.  2024 TSH wnl. CMP grossly unremarkable. WBC 6.4, Hg 13.5, MCV 76.9. plat 205. Iron 44, TIBC 491, sat 9%, ferritin 6.5.  She has been taking oral Iron TID since May 2024. I recommend to limit use of the naproxen. No prior h/o blood transfusion or iron infusion.  She has cold hands and feet. + palpitation.   We discuss about possible iron infusion if she does not respond to oral iron. Potential SE of iron infusion discussed with her including allergic reaction.  4 children. 2024 mammogram. No prior colon cancer screening.  She is agreeable to see GI at [place] for potential endoscopic study.  2024 WBC 8, Hg 14.5, MCV 85.6, plat 207. Ferritin 51, Iron 58, TIBC 344. Sat 17.   She responds well to oral iron.  Potential partial hysterectomy d/t menorrhagia once approved by insurance.  2024 s/p partial hysterectomy at [facility].  2024 CBC grossly wnl. Ferritin 84, Iron 109, TIBC 349, sat 31.   I re refer to GI for PAM.    2025 follow up visit.    12/10/2024 EGD and colonoscopy with Dr Canchola. DAYDAY 7 years.     1/3/2025 WBC 8.5, Hg 16.4, plat 234. Ferritin 84, iron 44, TIBC 369, sat 12. She stopped oral iron 3D post hysterectomy.   I recommend smoking cessation. She drinks enough

## 2025-01-28 ENCOUNTER — HOSPITAL ENCOUNTER (OUTPATIENT)
Dept: INFUSION THERAPY | Age: 45
Discharge: HOME | End: 2025-01-28
Payer: MEDICAID

## 2025-01-28 ENCOUNTER — OFFICE VISIT (OUTPATIENT)
Dept: ONCOLOGY | Age: 45
End: 2025-01-28
Payer: MEDICAID

## 2025-01-28 VITALS
HEIGHT: 65 IN | TEMPERATURE: 97.7 F | DIASTOLIC BLOOD PRESSURE: 78 MMHG | HEART RATE: 77 BPM | SYSTOLIC BLOOD PRESSURE: 123 MMHG | WEIGHT: 190 LBS | BODY MASS INDEX: 31.65 KG/M2 | OXYGEN SATURATION: 97 %

## 2025-01-28 DIAGNOSIS — D64.9 ANEMIA, UNSPECIFIED TYPE: ICD-10-CM

## 2025-01-28 DIAGNOSIS — D75.1 POLYCYTHEMIA: Primary | ICD-10-CM

## 2025-01-28 DIAGNOSIS — D75.1 POLYCYTHEMIA: ICD-10-CM

## 2025-01-28 LAB
BASOPHILS # BLD: 0.03 K/UL
BASOPHILS NFR BLD: 0 % (ref 0–1)
EOSINOPHIL # BLD: 0.16 K/UL
EOSINOPHILS RELATIVE PERCENT: 2 % (ref 0–3)
ERYTHROCYTE [DISTWIDTH] IN BLOOD BY AUTOMATED COUNT: 14.2 % (ref 11.7–14.9)
HCT VFR BLD AUTO: 48.5 % (ref 37–47)
HGB BLD-MCNC: 16.3 G/DL (ref 12.5–16)
LYMPHOCYTES NFR BLD: 2.46 K/UL
LYMPHOCYTES RELATIVE PERCENT: 27 % (ref 24–44)
MCH RBC QN AUTO: 29.2 PG (ref 27–31)
MCHC RBC AUTO-ENTMCNC: 33.6 G/DL (ref 32–36)
MCV RBC AUTO: 86.9 FL (ref 78–100)
MONOCYTES NFR BLD: 0.46 K/UL
MONOCYTES NFR BLD: 5 % (ref 0–4)
NEUTROPHILS NFR BLD: 65 % (ref 36–66)
NEUTS SEG NFR BLD: 5.87 K/UL
PLATELET # BLD AUTO: 225 K/UL (ref 140–440)
PMV BLD AUTO: 10.7 FL (ref 7.5–11.1)
RBC # BLD AUTO: 5.58 M/UL (ref 4.2–5.4)
WBC OTHER # BLD: 9 K/UL (ref 4–10.5)

## 2025-01-28 PROCEDURE — 36415 COLL VENOUS BLD VENIPUNCTURE: CPT

## 2025-01-28 PROCEDURE — 82668 ASSAY OF ERYTHROPOIETIN: CPT

## 2025-01-28 PROCEDURE — 99214 OFFICE O/P EST MOD 30 MIN: CPT | Performed by: INTERNAL MEDICINE

## 2025-01-28 PROCEDURE — 99211 OFF/OP EST MAY X REQ PHY/QHP: CPT

## 2025-01-28 PROCEDURE — 85025 COMPLETE CBC W/AUTO DIFF WBC: CPT

## 2025-01-28 ASSESSMENT — PATIENT HEALTH QUESTIONNAIRE - PHQ9
SUM OF ALL RESPONSES TO PHQ QUESTIONS 1-9: 0
2. FEELING DOWN, DEPRESSED OR HOPELESS: NOT AT ALL
SUM OF ALL RESPONSES TO PHQ9 QUESTIONS 1 & 2: 0
SUM OF ALL RESPONSES TO PHQ QUESTIONS 1-9: 0
1. LITTLE INTEREST OR PLEASURE IN DOING THINGS: NOT AT ALL

## 2025-01-28 NOTE — PROGRESS NOTES
MA Rooming Questions  Patient: Anjana Alatorre  MRN: [id number]    Date: 1/28/2025        1. Do you have any new issues?   no         2. Do you need any refills on medications?    no    3. Have you had any imaging done since your last visit?   yes - EDG and colonoscopy 12/10    4. Have you been hospitalized or seen in the emergency room since your last visit here?   no    5. Did the patient have a depression screening completed today? Yes    PHQ-9 Total Score: 0 (1/28/2025 11:51 AM)       PHQ-9 Given to (if applicable):               PHQ-9 Score (if applicable):                     [] Positive     []  Negative              Does question #9 need addressed (if applicable)                     [] Yes    []  No               Anais Clifford CMA

## 2025-01-30 LAB — EPO SERPL-ACNC: 8 MU/ML (ref 4–27)

## 2025-02-01 NOTE — PROGRESS NOTES
Patient Name:  Anjana Alatorre  Patient :  1980  Patient MRN:  [id number]     Primary Oncologist: Saima Lara MD  Referring Provider: Not, On File (Inactive)     Date of Service: 2025      Chief Complaint:    No chief complaint on file.    FU visit.    Patient Active Problem List:     Effusion, left knee     Sprain of left knee     HPI:   Anjana Stone is a pleasant 43 yo female patient who was referred for evaluation of PAM.   She has prolonged and heavy menstruation. Menstruation lasts ~ 7 - 8 days and she has 5 - 6D of heavy menstruation.  Seen by gyn at [place] [facility] who offered hormonal therapy vs hysterectomy.  2019 CMP grossly unremarkable. WBC 9.8, Hg 11.4, MCV 76.5, plat 268.  2024 TSH wnl. CMP grossly unremarkable. WBC 6.4, Hg 13.5, MCV 76.9. plat 205. Iron 44, TIBC 491, sat 9%, ferritin 6.5.  She has been taking oral Iron TID since May 2024. I recommend to limit use of the naproxen. No prior h/o blood transfusion or iron infusion.  She has cold hands and feet. + palpitation.   We discuss about possible iron infusion if she does not respond to oral iron. Potential SE of iron infusion discussed with her including allergic reaction.  4 children. 2024 mammogram. No prior colon cancer screening.  She is agreeable to see GI at [place] for potential endoscopic study.  2024 WBC 8, Hg 14.5, MCV 85.6, plat 207. Ferritin 51, Iron 58, TIBC 344. Sat 17.   She responds well to oral iron.  Potential partial hysterectomy d/t menorrhagia once approved by insurance.  2024 s/p partial hysterectomy at [facility].  2024 CBC grossly wnl. Ferritin 84, Iron 109, TIBC 349, sat 31.   12/10/2024 EGD and colonoscopy with Dr Canchola. DAYDAY 7 years.     1/3/2025 WBC 8.5, Hg 16.4, plat 234. Ferritin 84, iron 44, TIBC 369, sat 12. She stopped oral iron 3D post hysterectomy.   I recommend smoking cessation. She drinks enough water.    2025 follow up visit.    25 WBC 9, Hg 16.3, HCT 48.4, plat 225.

## 2025-02-11 ENCOUNTER — HOSPITAL ENCOUNTER (OUTPATIENT)
Dept: ULTRASOUND IMAGING | Age: 45
Discharge: HOME | End: 2025-02-11
Payer: MEDICAID

## 2025-02-11 DIAGNOSIS — D75.1 POLYCYTHEMIA: ICD-10-CM

## 2025-02-11 PROCEDURE — 76700 US EXAM ABDOM COMPLETE: CPT

## 2025-02-25 ENCOUNTER — HOSPITAL ENCOUNTER (OUTPATIENT)
Dept: INFUSION THERAPY | Age: 45
Discharge: HOME | End: 2025-02-25
Payer: MEDICAID

## 2025-02-25 ENCOUNTER — OFFICE VISIT (OUTPATIENT)
Dept: ONCOLOGY | Age: 45
End: 2025-02-25
Payer: MEDICAID

## 2025-02-25 VITALS
HEIGHT: 65 IN | BODY MASS INDEX: 31.59 KG/M2 | HEART RATE: 87 BPM | OXYGEN SATURATION: 98 % | DIASTOLIC BLOOD PRESSURE: 87 MMHG | WEIGHT: 189.6 LBS | TEMPERATURE: 97.7 F | SYSTOLIC BLOOD PRESSURE: 117 MMHG

## 2025-02-25 DIAGNOSIS — D75.1 POLYCYTHEMIA: Primary | ICD-10-CM

## 2025-02-25 PROCEDURE — 99212 OFFICE O/P EST SF 10 MIN: CPT

## 2025-02-25 PROCEDURE — 99213 OFFICE O/P EST LOW 20 MIN: CPT | Performed by: INTERNAL MEDICINE

## 2025-02-25 NOTE — PROGRESS NOTES
MA Rooming Questions  Patient: Anjana Alatorre  MRN: [id number]    Date: 2/25/2025        1. Do you have any new issues?   no         2. Do you need any refills on medications?    no    3. Have you had any imaging done since your last visit?   yes - labs 1/28 us 2/11    4. Have you been hospitalized or seen in the emergency room since your last visit here?   no    5. Did the patient have a depression screening completed today? No    No data recorded     PHQ-9 Given to (if applicable):               PHQ-9 Score (if applicable):                     [] Positive     []  Negative              Does question #9 need addressed (if applicable)                     [] Yes    []  No               Katia Polanco CMA

## 2025-04-11 LAB
BCR-ABL QUANTITATIVE: NORMAL
CALR MUTATION: NORMAL
JAK2 EXONS 12-15 MUTATION: NORMAL
JAK2 GENE MUTATION QUAL: NORMAL
MPL 515 MUTATION: NORMAL

## 2025-05-28 ENCOUNTER — TELEPHONE (OUTPATIENT)
Dept: ONCOLOGY | Age: 45
End: 2025-05-28

## 2025-05-28 NOTE — TELEPHONE ENCOUNTER
Called patient to reschedule their no show appointment on 5/285 with .  Left message for patient to return call to reschedule appointment.  Sending a message for  to send out a NO SHOW letter.

## (undated) DEVICE — ENDOSCOPY KIT: Brand: MEDLINE INDUSTRIES, INC.

## (undated) DEVICE — SNARE ENDOSCP CLD 230 CM 10 MM 2.3 MM ROTATABLE LESIONHUNTER

## (undated) DEVICE — FORCEPS BX L240CM JAW DIA2.8MM L CAP W/ NDL MIC MESH TOOTH